# Patient Record
Sex: FEMALE | Race: WHITE | NOT HISPANIC OR LATINO | URBAN - METROPOLITAN AREA
[De-identification: names, ages, dates, MRNs, and addresses within clinical notes are randomized per-mention and may not be internally consistent; named-entity substitution may affect disease eponyms.]

---

## 2022-01-16 ENCOUNTER — EMERGENCY (EMERGENCY)
Facility: HOSPITAL | Age: 47
LOS: 1 days | Discharge: HOME | End: 2022-01-16
Attending: EMERGENCY MEDICINE | Admitting: EMERGENCY MEDICINE
Payer: COMMERCIAL

## 2022-01-16 VITALS
TEMPERATURE: 97 F | DIASTOLIC BLOOD PRESSURE: 70 MMHG | SYSTOLIC BLOOD PRESSURE: 102 MMHG | HEIGHT: 64 IN | RESPIRATION RATE: 16 BRPM | OXYGEN SATURATION: 98 % | WEIGHT: 102.07 LBS | HEART RATE: 82 BPM

## 2022-01-16 VITALS
RESPIRATION RATE: 14 BRPM | HEART RATE: 84 BPM | SYSTOLIC BLOOD PRESSURE: 101 MMHG | DIASTOLIC BLOOD PRESSURE: 63 MMHG | OXYGEN SATURATION: 99 %

## 2022-01-16 DIAGNOSIS — R42 DIZZINESS AND GIDDINESS: ICD-10-CM

## 2022-01-16 DIAGNOSIS — R11.2 NAUSEA WITH VOMITING, UNSPECIFIED: ICD-10-CM

## 2022-01-16 PROCEDURE — 99284 EMERGENCY DEPT VISIT MOD MDM: CPT | Mod: 25

## 2022-01-16 PROCEDURE — 99284 EMERGENCY DEPT VISIT MOD MDM: CPT

## 2022-01-16 PROCEDURE — 96374 THER/PROPH/DIAG INJ IV PUSH: CPT

## 2022-01-16 RX ORDER — ONDANSETRON 8 MG/1
4 TABLET, FILM COATED ORAL ONCE
Refills: 0 | Status: COMPLETED | OUTPATIENT
Start: 2022-01-16 | End: 2022-01-16

## 2022-01-16 RX ORDER — SODIUM CHLORIDE 9 MG/ML
1000 INJECTION INTRAMUSCULAR; INTRAVENOUS; SUBCUTANEOUS ONCE
Refills: 0 | Status: COMPLETED | OUTPATIENT
Start: 2022-01-16 | End: 2022-01-16

## 2022-01-16 RX ADMIN — ONDANSETRON 4 MILLIGRAM(S): 8 TABLET, FILM COATED ORAL at 02:42

## 2022-01-16 RX ADMIN — SODIUM CHLORIDE 1000 MILLILITER(S): 9 INJECTION INTRAMUSCULAR; INTRAVENOUS; SUBCUTANEOUS at 02:42

## 2022-01-16 NOTE — ED PROVIDER NOTE - OBJECTIVE STATEMENT
47 y/o f presents c/o n/v after having 3 alcoholic drinks and eating a marijuana edible tonight.  Pt stating she feels mildly lightheaded as well.  Denies CP, SOB, abd pain, diarrhea, all other ROS negative.

## 2022-01-16 NOTE — ED PROVIDER NOTE - CLINICAL SUMMARY MEDICAL DECISION MAKING FREE TEXT BOX
47 y/o f presents c/o n/v tonight after having a few alcoholic drinks and eating a marijuana edible.  VSS in ED, pt given IV fluid, zofran, will reassess of improvement.

## 2022-01-16 NOTE — ED PROVIDER NOTE - ATTENDING CONTRIBUTION TO CARE
45 y/o f presents c/o n/v after having 3 alcoholic drinks and eating a marijuana edible tonight.  Pt stating she feels mildly lightheaded as well.  Denies CP, SOB, abd pain, diarrhea, all other ROS negative. Pt's  also a pt  Limited PE performed in the setting of the COVID10 pandemic, in efforts to limit exposure and cross-contamination  Constitutional: Well appearing, awake, alert, oriented to person, place, time/situation and in no apparent distress.  ENMT: Airway patent.   Cardiac: Normal rate, regular rhythm.   Respiratory: No increased WOB, tachypnea, hypoxia, or accessory mm use. Pt speaks in full sentences.   Musculoskeletal: Range of motion is not limited  Neuro: Alert and oriented x 3, face symmetric and speech fluent. Nml gross motor movement, grossly non focal   Skin: Skin normal color for race, warm, dry and intact. No evidence of rash.  Psych: Alert and oriented to person, place, time/situation. normal mood and affect. no apparent risk to self or others.   Polysubstance abuse w/ vomiting. VSS. Anxious, but non toxic. IVF, antiemetics given and observed in ED w/ improvement. Clear speech, steady gait, d/c'd in c/o family

## 2022-01-16 NOTE — ED PROVIDER NOTE - PATIENT PORTAL LINK FT
You can access the FollowMyHealth Patient Portal offered by [facility] by registering at the following website: [email or web address]. By joining [employer]’s FollowMyHealth portal, you will also be able to view your health information using other applications (apps) compatible with our system.

## 2023-07-17 NOTE — ED ADULT NURSE NOTE - OBJECTIVE STATEMENT
Forms faxed back to [phone].   pt c/o nausea and vomiting that started after she drank alcohol and ate edibles. denies daily ETOH use. lungs clear bilaterally upon aucultation. no use of accessory muscles noted. Denies any chest pain or discomfort at this time. no active vomiting at this time.